# Patient Record
Sex: MALE
[De-identification: names, ages, dates, MRNs, and addresses within clinical notes are randomized per-mention and may not be internally consistent; named-entity substitution may affect disease eponyms.]

---

## 2020-03-22 ENCOUNTER — HOSPITAL ENCOUNTER (EMERGENCY)
Dept: HOSPITAL 92 - ERS | Age: 30
Discharge: HOME | End: 2020-03-22
Payer: COMMERCIAL
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
## 2020-03-22 DIAGNOSIS — S53.401A: Primary | ICD-10-CM

## 2020-03-22 NOTE — RAD
EXAM: 4 views of the right elbow



HISTORY: Elbow pain after fall



COMPARISON: None



FINDINGS: No elbow effusion is seen. There is no evidence of acute fracture or dislocation.  No signi
ficant degenerative changes are seen.   No soft tissue swelling is present.



IMPRESSION: No evidence of acute osseous abnormality.



Reported By: Jake Milligan 

Electronically Signed:  3/22/2020 9:53 PM

## 2020-04-13 ENCOUNTER — HOSPITAL ENCOUNTER (OUTPATIENT)
Dept: HOSPITAL 92 - BICRAD | Age: 30
Discharge: HOME | End: 2020-04-13
Attending: FAMILY MEDICINE
Payer: COMMERCIAL

## 2020-04-13 DIAGNOSIS — M25.421: ICD-10-CM

## 2020-04-13 DIAGNOSIS — S53.401D: Primary | ICD-10-CM

## 2020-04-13 NOTE — RAD
RIGHT ELBOW TWO VIEWS:

 

Date: 4-

 

Provided Clinical History: 

Pain, status post injury. 

 

FINDINGS: 

Comparison is made with the study dated 3-. 

 

There is no evidence for fracture or other acute osseous abnormality. Alignment appears anatomic. Vi
nt spaces appear preserved. Elbow joint effusion is demonstrated. 

 

IMPRESSION: 

Elbow joint effusion without evidence for an acute osseous abnormality. If there is concern for inter
nal derangement, consider MRI. 

 

POS: [id number]